# Patient Record
Sex: MALE | Race: WHITE | NOT HISPANIC OR LATINO | Employment: OTHER
[De-identification: names, ages, dates, MRNs, and addresses within clinical notes are randomized per-mention and may not be internally consistent; named-entity substitution may affect disease eponyms.]

---

## 2022-04-23 ENCOUNTER — TRANSCRIBE ORDERS (OUTPATIENT)
Dept: OTHER | Age: 57
End: 2022-04-23
Payer: COMMERCIAL

## 2022-04-23 DIAGNOSIS — H46.9 OPTIC NEURITIS, POSTERIOR: Primary | ICD-10-CM

## 2022-07-18 NOTE — TELEPHONE ENCOUNTER
Action 7/18/22 MV [id number]   Action Taken Imaging request faxed to [facility]    7/20/22 MV 2.44pm  Images will be mailed per [facility]     Action 07.22.2022 RM   Action Taken Images received from [facility] took to 4n to be uploaded to chart         RECORDS RECEIVED FROM: external   REASON FOR VISIT: Optic neuritis, posterior   Date of Appt: 10/27/22   NOTES (FOR ALL VISITS) STATUS DETAILS   OFFICE NOTE from referring provider Care Everywhere Dr Carter Carrillo @ [facility]:     OFFICE NOTE from other specialist Care Everywhere Dr Brina Maldonado @ [facility] Neurology:  7/12/22 6/14/22 5/16/22   MEDICATION LIST Care Everywhere    IMAGING  (FOR ALL VISITS)     MRI (HEAD, NECK, SPINE) Received [facility]:  MRI Head 5/18/22  MRI Thoracic Spine 5/17/22  MRI Cervical Spine 5/17/22  MRI Head 3/31/22

## 2022-10-27 ENCOUNTER — PRE VISIT (OUTPATIENT)
Dept: NEUROLOGY | Facility: CLINIC | Age: 57
End: 2022-10-27